# Patient Record
(demographics unavailable — no encounter records)

---

## 2020-10-02 NOTE — RAD
PA AND LATERAL CHEST:

10/2/20

 

HISTORY: 

Contact dermatitis. 

 

Heart size and mediastinum are within normal limits. There is some linear interstitial change compati
ble with some scarring. There is slight flattening to the hemidiaphragms. No focal infiltrative proce
ss. 

 

IMPRESSION: 

Mild chronic appearing lung change. 

 

POS: NIR